# Patient Record
Sex: FEMALE | Race: WHITE | NOT HISPANIC OR LATINO | URBAN - METROPOLITAN AREA
[De-identification: names, ages, dates, MRNs, and addresses within clinical notes are randomized per-mention and may not be internally consistent; named-entity substitution may affect disease eponyms.]

---

## 2024-05-20 ENCOUNTER — LAB REQUISITION (OUTPATIENT)
Dept: LAB | Facility: HOSPITAL | Age: 24
End: 2024-05-20
Payer: COMMERCIAL

## 2024-05-20 DIAGNOSIS — N39.0 URINARY TRACT INFECTION, SITE NOT SPECIFIED: ICD-10-CM

## 2024-05-20 PROCEDURE — 87186 SC STD MICRODIL/AGAR DIL: CPT

## 2024-05-20 PROCEDURE — 87086 URINE CULTURE/COLONY COUNT: CPT

## 2024-05-23 LAB — BACTERIA UR CULT: ABNORMAL

## 2024-07-19 ENCOUNTER — LAB (OUTPATIENT)
Dept: LAB | Facility: LAB | Age: 24
End: 2024-07-19
Payer: COMMERCIAL

## 2024-07-19 ENCOUNTER — APPOINTMENT (OUTPATIENT)
Dept: PRIMARY CARE | Facility: CLINIC | Age: 24
End: 2024-07-19
Payer: COMMERCIAL

## 2024-07-19 VITALS
OXYGEN SATURATION: 97 % | HEIGHT: 68 IN | SYSTOLIC BLOOD PRESSURE: 120 MMHG | DIASTOLIC BLOOD PRESSURE: 79 MMHG | BODY MASS INDEX: 39.92 KG/M2 | WEIGHT: 263.4 LBS | HEART RATE: 87 BPM

## 2024-07-19 DIAGNOSIS — K29.50 CHRONIC GASTRITIS WITHOUT BLEEDING, UNSPECIFIED GASTRITIS TYPE: ICD-10-CM

## 2024-07-19 DIAGNOSIS — Z00.00 ANNUAL PHYSICAL EXAM: ICD-10-CM

## 2024-07-19 DIAGNOSIS — R10.13 EPIGASTRIC PAIN: ICD-10-CM

## 2024-07-19 DIAGNOSIS — R10.13 EPIGASTRIC PAIN: Primary | ICD-10-CM

## 2024-07-19 PROBLEM — F41.9 ANXIETY DISORDER: Status: ACTIVE | Noted: 2024-07-19

## 2024-07-19 PROBLEM — F98.8 ADD (ATTENTION DEFICIT DISORDER): Status: ACTIVE | Noted: 2024-07-19

## 2024-07-19 PROBLEM — N75.0 BARTHOLIN GLAND CYST: Status: RESOLVED | Noted: 2018-09-30 | Resolved: 2024-07-19

## 2024-07-19 PROCEDURE — 3008F BODY MASS INDEX DOCD: CPT | Performed by: INTERNAL MEDICINE

## 2024-07-19 PROCEDURE — 99204 OFFICE O/P NEW MOD 45 MIN: CPT | Performed by: INTERNAL MEDICINE

## 2024-07-19 PROCEDURE — 83013 H PYLORI (C-13) BREATH: CPT

## 2024-07-19 PROCEDURE — 1036F TOBACCO NON-USER: CPT | Performed by: INTERNAL MEDICINE

## 2024-07-19 RX ORDER — OMEPRAZOLE 40 MG/1
40 CAPSULE, DELAYED RELEASE ORAL
Qty: 30 CAPSULE | Refills: 3 | Status: SHIPPED | OUTPATIENT
Start: 2024-07-19

## 2024-07-19 ASSESSMENT — ENCOUNTER SYMPTOMS
DYSURIA: 0
ABDOMINAL PAIN: 1
CONSTIPATION: 0
FREQUENCY: 0
PALPITATIONS: 0
SHORTNESS OF BREATH: 0
DIARRHEA: 0
VOMITING: 0
ARTHRALGIAS: 0
DIZZINESS: 0
LIGHT-HEADEDNESS: 0
FEVER: 0
TROUBLE SWALLOWING: 0
NAUSEA: 0
COUGH: 0
SORE THROAT: 0
FATIGUE: 0

## 2024-07-19 ASSESSMENT — PAIN SCALES - GENERAL: PAINLEVEL: 2

## 2024-07-19 ASSESSMENT — PATIENT HEALTH QUESTIONNAIRE - PHQ9
2. FEELING DOWN, DEPRESSED OR HOPELESS: NOT AT ALL
SUM OF ALL RESPONSES TO PHQ9 QUESTIONS 1 AND 2: 0
1. LITTLE INTEREST OR PLEASURE IN DOING THINGS: NOT AT ALL

## 2024-07-19 NOTE — PATIENT INSTRUCTIONS
Take omeprazole 40 mg every day until follow up appointment but do not start until after breath test      Get fasting blood work and breath test.    Follow up Dr Haji in 2 months for gastritis 30 min appointment

## 2024-07-19 NOTE — ASSESSMENT & PLAN NOTE
Patient has what appears to be a chronic gastritis and or GERD.  I would rec an H Pylori breath test followed by a trial of treatment with omeprazole 40 mg daily for 6 to 8 weeks.  I will see her back after the trial of PPI.    I will also check baseline labs.    I have considered  a differential including IBS, IBD, Pancreatitis  and other dx but the  gastritis seems most likely.

## 2024-07-19 NOTE — PROGRESS NOTES
"Subjective   Patient ID: Jessica Elizabeth is a 24 y.o. female who presents for new patient visit.    Patient is here for ongoing stomach issues.  Her symptoms began over a year ago while she was in [place] studying abroad.  She began an eval in [place] but then came back to the [place] and never completed the eval.  Her symptoms were also waxing and waning.  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     Symptoms are primarily pain in the epigastric area . The pain can radiate to the right or the left of the abdomen but always in the upper abd.  She also has episodes of severe heart burn and reflux.  She does have an acidic or foul taste in her throat on occasion as well.        Review of Systems   Constitutional:  Negative for fatigue and fever.   HENT:  Negative for sore throat and trouble swallowing.    Eyes:  Negative for visual disturbance.   Respiratory:  Negative for cough and shortness of breath.    Cardiovascular:  Negative for chest pain, palpitations and leg swelling.   Gastrointestinal:  Positive for abdominal pain. Negative for constipation, diarrhea, nausea and vomiting.   Genitourinary:  Negative for dysuria and frequency.   Musculoskeletal:  Negative for arthralgias.   Skin:  Negative for rash.   Neurological:  Negative for dizziness and light-headedness.       Objective   Medication Documentation Review Audit       Reviewed by Rubina Haji MD (Physician) on 07/19/24 at 1141      Medication Order Taking? Sig Documenting Provider Last Dose Status   levonorgestrel (Mirena) 21 mcg/24 hr (8 yrs) 52 mg IUD [id number] Yes 52 mg by intrauterine route 1 time. Historical Provider, MD  Active                  No Known Allergies    /79 (BP Location: Left arm, Patient Position: Sitting)   Pulse 87   Ht 1.727 m (5' 8\")   Wt 119 kg (263 lb 6.4 oz)   SpO2 97%   BMI 40.05 kg/m²     Physical Exam  Constitutional:       Appearance: Normal appearance.   HENT:      " Head: Normocephalic and atraumatic.      Nose: Nose normal.   Eyes:      Extraocular Movements: Extraocular movements intact.      Pupils: Pupils are equal, round, and reactive to light.   Cardiovascular:      Rate and Rhythm: Normal rate and regular rhythm.   Pulmonary:      Breath sounds: Normal breath sounds.   Abdominal:      General: Abdomen is flat. Bowel sounds are normal.      Palpations: Abdomen is soft.   Musculoskeletal:      Right lower leg: No edema.      Left lower leg: No edema.   Neurological:      Mental Status: She is alert.           Assessment/Plan   Problem List Items Addressed This Visit       Chronic gastritis without bleeding     Patient has what appears to be a chronic gastritis and or GERD.  I would rec an H Pylori breath test followed by a trial of treatment with omeprazole 40 mg daily for 6 to 8 weeks.  I will see her back after the trial of PPI.    I will also check baseline labs.    I have considered  a differential including IBS, IBD, Pancreatitis  and other dx but the  gastritis seems most likely.         Relevant Medications    omeprazole (PriLOSEC) 40 mg DR capsule    Epigastric pain - Primary    Relevant Medications    omeprazole (PriLOSEC) 40 mg DR capsule    Other Relevant Orders    H. Pylori Breath Test     Other Visit Diagnoses       Annual physical exam        Relevant Orders    Lipid Panel    CBC    Comprehensive Metabolic Panel    TSH with reflex to Free T4 if abnormal    Vitamin D 25-Hydroxy,Total (for eval of Vitamin D levels)                   It has been a pleasure seeing you.  Rubina Haji MD

## 2024-07-20 LAB — UREA BREATH TEST QL: NEGATIVE

## 2024-08-12 ENCOUNTER — TELEPHONE (OUTPATIENT)
Dept: PRIMARY CARE | Facility: CLINIC | Age: 24
End: 2024-08-12

## 2024-08-12 ENCOUNTER — OFFICE VISIT (OUTPATIENT)
Dept: PRIMARY CARE | Facility: CLINIC | Age: 24
End: 2024-08-12
Payer: COMMERCIAL

## 2024-08-12 VITALS
BODY MASS INDEX: 40.13 KG/M2 | DIASTOLIC BLOOD PRESSURE: 82 MMHG | SYSTOLIC BLOOD PRESSURE: 121 MMHG | OXYGEN SATURATION: 96 % | HEART RATE: 78 BPM | WEIGHT: 264.8 LBS | HEIGHT: 68 IN

## 2024-08-12 DIAGNOSIS — J40 BRONCHITIS: ICD-10-CM

## 2024-08-12 DIAGNOSIS — R06.2 WHEEZE: Primary | ICD-10-CM

## 2024-08-12 DIAGNOSIS — R05.1 ACUTE COUGH: ICD-10-CM

## 2024-08-12 PROCEDURE — 99213 OFFICE O/P EST LOW 20 MIN: CPT | Performed by: INTERNAL MEDICINE

## 2024-08-12 PROCEDURE — 1036F TOBACCO NON-USER: CPT | Performed by: INTERNAL MEDICINE

## 2024-08-12 PROCEDURE — 3008F BODY MASS INDEX DOCD: CPT | Performed by: INTERNAL MEDICINE

## 2024-08-12 RX ORDER — DOXYCYCLINE 100 MG/1
100 CAPSULE ORAL 2 TIMES DAILY
Qty: 20 CAPSULE | Refills: 0 | Status: SHIPPED | OUTPATIENT
Start: 2024-08-12 | End: 2024-08-22

## 2024-08-12 RX ORDER — ALBUTEROL SULFATE 90 UG/1
2 INHALANT RESPIRATORY (INHALATION) EVERY 4 HOURS PRN
Qty: 8 G | Refills: 1 | Status: SHIPPED | OUTPATIENT
Start: 2024-08-12 | End: 2025-08-12

## 2024-08-12 ASSESSMENT — ENCOUNTER SYMPTOMS
FATIGUE: 1
DIARRHEA: 0
SINUS PRESSURE: 1
SHORTNESS OF BREATH: 1
TROUBLE SWALLOWING: 0
FEVER: 0
NAUSEA: 0
SORE THROAT: 0
DYSURIA: 0
ABDOMINAL PAIN: 0
CONSTIPATION: 0
SINUS PAIN: 1
VOMITING: 0
RHINORRHEA: 0
FREQUENCY: 0
ARTHRALGIAS: 0
DIZZINESS: 0
LIGHT-HEADEDNESS: 0
PALPITATIONS: 0
COUGH: 1

## 2024-08-12 ASSESSMENT — PATIENT HEALTH QUESTIONNAIRE - PHQ9
1. LITTLE INTEREST OR PLEASURE IN DOING THINGS: NOT AT ALL
SUM OF ALL RESPONSES TO PHQ9 QUESTIONS 1 AND 2: 0
2. FEELING DOWN, DEPRESSED OR HOPELESS: NOT AT ALL

## 2024-08-12 ASSESSMENT — PAIN SCALES - GENERAL: PAINLEVEL: 0-NO PAIN

## 2024-08-12 NOTE — ASSESSMENT & PLAN NOTE
Patient has cough and wheezing and is already on amoxicillin.  This may be a viral bronchitis however because it is worsening despite antibiotic I do recommend changing to something that may have better coverage for atypicals I am going to switch her to doxycycline 100 mg twice daily for the next 10 days.  In addition to this I am going to give her an albuterol inhaler to use 2 puffs 3 times a day.  She was instructed on how to use it and told that if she has any problems to asked the pharmacist and he could instruct her again.  Patient is already getting some vaginal itching from the amoxicillin so I did recommend a probiotic such as caffeine or yogurt OraLife way or mixed probiotic.  She may also use Monistat 7 over-the-counter.  She is to complete all antibiotics and if continues to have vaginal infection or itching can call back and we will consider Diflucan once she has completed the antibiotic.  Patient states understanding will call back if she needs anything else   patient to speak with md

## 2024-08-12 NOTE — PROGRESS NOTES
Subjective   Patient ID: Jessica Elizabeth is a 24 y.o. female who presents for an ongoing cough and congestion.    Patient is here as a work in visit for ongoing cough congestion and respiratory infection.  Around July 11 patient had a cough and for 2 weeks but then it moved into her head she started having postnasal drip congestion fatigue and became achy so she went to the urgent care where they gave her amoxicillin.  They thought she had a sinus infection and told her that if it does not get better in the next week she should see her primary care physician.  In the past week the cough has become more productive she feels more fatigued it is hard to take a deep breath in and she feels short of breath.  She denies nausea vomiting constipation diarrhea fever chills.  Occasionally her ears are popping but otherwise are not painful.  She has a pulse oximeter at home and from time to time and is gone down to 94%.  Patient denies sore throat but she also has a slight rash on her chin she is not quite sure what the rash is from.        Review of Systems   Constitutional:  Positive for fatigue. Negative for fever.   HENT:  Positive for congestion, nosebleeds, sinus pressure, sinus pain and sneezing. Negative for ear pain, postnasal drip, rhinorrhea, sore throat and trouble swallowing.    Eyes:  Negative for visual disturbance.   Respiratory:  Positive for cough and shortness of breath.    Cardiovascular:  Negative for chest pain, palpitations and leg swelling.   Gastrointestinal:  Negative for abdominal pain, constipation, diarrhea, nausea and vomiting.   Genitourinary:  Negative for dysuria and frequency.   Musculoskeletal:  Negative for arthralgias.   Skin:  Positive for rash.   Neurological:  Negative for dizziness and light-headedness.       Objective   Medication Documentation Review Audit       Reviewed by Rubina Haji MD (Physician) on 08/12/24 at 1448      Medication Order Taking? Sig Documenting Provider Last  "Dose Status   levonorgestrel (Mirena) 21 mcg/24 hr (8 yrs) 52 mg IUD [id number]  52 mg by intrauterine route 1 time. Historical Provider, MD  Active   omeprazole (PriLOSEC) 40 mg DR capsule [id number]  Take 1 capsule (40 mg) by mouth once daily in the morning. Take before meals. Do not crush or chew. Rubina Haji MD  Active                  No Known Allergies    /82 (BP Location: Left arm, Patient Position: Sitting)   Pulse 78   Ht 1.715 m (5' 7.5\")   Wt 120 kg (264 lb 12.8 oz)   SpO2 96%   BMI 40.86 kg/m²     Physical Exam  Constitutional:       Appearance: Normal appearance.   HENT:      Head: Normocephalic and atraumatic.      Nose: Nose normal.   Eyes:      Extraocular Movements: Extraocular movements intact.      Pupils: Pupils are equal, round, and reactive to light.   Cardiovascular:      Rate and Rhythm: Normal rate and regular rhythm.   Pulmonary:      Breath sounds: Wheezing present.   Abdominal:      General: Abdomen is flat. Bowel sounds are normal.      Palpations: Abdomen is soft.   Musculoskeletal:      Right lower leg: No edema.      Left lower leg: No edema.   Skin:     Findings: Rash present.      Comments: Patient has a small red papular rash below her chin into her throat which blanches easily and does not appear to be infectious.   Neurological:      Mental Status: She is alert.           Assessment/Plan   Problem List Items Addressed This Visit       Wheeze - Primary    Relevant Medications    albuterol (Ventolin HFA) 90 mcg/actuation inhaler    doxycycline (Vibramycin) 100 mg capsule    Acute cough    Bronchitis     Patient has cough and wheezing and is already on amoxicillin.  This may be a viral bronchitis however because it is worsening despite antibiotic I do recommend changing to something that may have better coverage for atypicals I am going to switch her to doxycycline 100 mg twice daily for the next 10 days.  In addition to this I am going to give her an albuterol inhaler " to use 2 puffs 3 times a day.  She was instructed on how to use it and told that if she has any problems to asked the pharmacist and he could instruct her again.  Patient is already getting some vaginal itching from the amoxicillin so I did recommend a probiotic such as caffeine or yogurt OraLife way or mixed probiotic.  She may also use Monistat 7 over-the-counter.  She is to complete all antibiotics and if continues to have vaginal infection or itching can call back and we will consider Diflucan once she has completed the antibiotic.  Patient states understanding will call back if she needs anything else         Relevant Medications    albuterol (Ventolin HFA) 90 mcg/actuation inhaler    doxycycline (Vibramycin) 100 mg capsule              It has been a pleasure seeing you.  Rubina Haji MD

## 2024-08-12 NOTE — TELEPHONE ENCOUNTER
Patient calling- has been fighting a cough since 7/11/24. Last two weeks cough has worsened so went to urgent care and was given- amoxicillin 500 mg twice a day for ten days. Also told to use over the counter  cough and congestion medication ,but that  has not helped much.  Fighting low energy with this as well. Did a covid test, and it was negative. Asking if she can be seen for this.

## 2024-08-12 NOTE — PATIENT INSTRUCTIONS
Take antibiotic with food    Use monistat 7 OTC for vaginal itching    Try Kefir yougurt or Lifeway 4 oz 2 to 3 times a day    Use inhailer 2 puffs 3 times a day

## 2024-09-04 ENCOUNTER — APPOINTMENT (OUTPATIENT)
Dept: PRIMARY CARE | Facility: CLINIC | Age: 24
End: 2024-09-04
Payer: COMMERCIAL

## 2025-04-02 ENCOUNTER — APPOINTMENT (OUTPATIENT)
Dept: PRIMARY CARE | Facility: CLINIC | Age: 25
End: 2025-04-02
Payer: COMMERCIAL

## 2025-04-02 VITALS
WEIGHT: 269.4 LBS | BODY MASS INDEX: 40.83 KG/M2 | OXYGEN SATURATION: 97 % | HEART RATE: 80 BPM | SYSTOLIC BLOOD PRESSURE: 128 MMHG | HEIGHT: 68 IN | DIASTOLIC BLOOD PRESSURE: 78 MMHG

## 2025-04-02 DIAGNOSIS — F41.1 GENERALIZED ANXIETY DISORDER: Primary | ICD-10-CM

## 2025-04-02 DIAGNOSIS — R41.840 ATTENTION OR CONCENTRATION DEFICIT: ICD-10-CM

## 2025-04-02 PROCEDURE — 3008F BODY MASS INDEX DOCD: CPT | Performed by: INTERNAL MEDICINE

## 2025-04-02 PROCEDURE — 1036F TOBACCO NON-USER: CPT | Performed by: INTERNAL MEDICINE

## 2025-04-02 PROCEDURE — 99213 OFFICE O/P EST LOW 20 MIN: CPT | Performed by: INTERNAL MEDICINE

## 2025-04-02 RX ORDER — DULOXETIN HYDROCHLORIDE 20 MG/1
20 CAPSULE, DELAYED RELEASE ORAL DAILY
Qty: 30 CAPSULE | Refills: 2 | Status: SHIPPED | OUTPATIENT
Start: 2025-04-02

## 2025-04-02 ASSESSMENT — ENCOUNTER SYMPTOMS
TROUBLE SWALLOWING: 0
ARTHRALGIAS: 0
FEVER: 0
DYSPHORIC MOOD: 0
FREQUENCY: 0
CONSTIPATION: 0
SLEEP DISTURBANCE: 0
PALPITATIONS: 0
NAUSEA: 0
DYSURIA: 0
HALLUCINATIONS: 0
SORE THROAT: 0
SHORTNESS OF BREATH: 0
ABDOMINAL PAIN: 0
DIARRHEA: 0
DECREASED CONCENTRATION: 1
LIGHT-HEADEDNESS: 0
CONFUSION: 0
DIZZINESS: 0
FATIGUE: 0
HYPERACTIVE: 0
COUGH: 0
AGITATION: 0
NERVOUS/ANXIOUS: 1
VOMITING: 0

## 2025-04-02 ASSESSMENT — PAIN SCALES - GENERAL: PAINLEVEL_OUTOF10: 0-NO PAIN

## 2025-04-02 ASSESSMENT — PATIENT HEALTH QUESTIONNAIRE - PHQ9
2. FEELING DOWN, DEPRESSED OR HOPELESS: NOT AT ALL
1. LITTLE INTEREST OR PLEASURE IN DOING THINGS: NOT AT ALL
SUM OF ALL RESPONSES TO PHQ9 QUESTIONS 1 AND 2: 0

## 2025-04-02 NOTE — ASSESSMENT & PLAN NOTE
Patient is here for possible treatment of her ADD versus anxiety.  Because of the way she describes the fatigue I am much more concerned about anxiety then I am her attention deficit disorder.  She does not want to try Strattera again but she did agree to try something similar.  At this time we are going to try her on duloxetine 20 mg daily and see her back in 1 month    If we are unable to stabilize her anxiety and mood we may have to send her back to psychiatry.

## 2025-04-02 NOTE — PROGRESS NOTES
Subjective   Patient ID: Jessica Elizabeth is a 25 y.o. female who presents for No chief complaint on file..  Patient is here for evaluation of possible ADD and or anxiety.  When she was younger she was seeing a pediatric psychiatrist who had her on several different medications.  They have tried Strattera in her but said it was of no help.  Then the psychiatrist tried several other drugs and retired and she did not have any treatment beyond that.  She does have known anxiety but no full-blown panic attacks.  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  She feels like she has been's a lot of energy trying to stay focused and is very fatigued and rundown.          Review of Systems   Constitutional:  Negative for fatigue and fever.   HENT:  Negative for sore throat and trouble swallowing.    Eyes:  Negative for visual disturbance.   Respiratory:  Negative for cough and shortness of breath.    Cardiovascular:  Negative for chest pain, palpitations and leg swelling.   Gastrointestinal:  Negative for abdominal pain, constipation, diarrhea, nausea and vomiting.   Genitourinary:  Negative for dysuria and frequency.   Musculoskeletal:  Negative for arthralgias.   Skin:  Negative for rash.   Neurological:  Negative for dizziness and light-headedness.   Psychiatric/Behavioral:  Positive for decreased concentration. Negative for agitation, behavioral problems, confusion, dysphoric mood, hallucinations, self-injury, sleep disturbance and suicidal ideas. The patient is nervous/anxious. The patient is not hyperactive.        Objective   Medication Documentation Review Audit       Reviewed by Rubina Haji MD (Physician) on 04/02/25 at 1508      Medication Order Taking? Sig Documenting Provider Last Dose Status   albuterol (Ventolin HFA) 90 mcg/actuation inhaler [id number]  Inhale 2 puffs every 4 hours if needed for wheezing or shortness of breath.   Patient not taking:  "Reported on 4/2/2025    Rubina Haji MD  Active   levonorgestrel (Mirena) 21 mcg/24 hr (8 yrs) 52 mg IUD [id number] Yes 52 mg by intrauterine route 1 time. Historical Provider, MD  Active   omeprazole (PriLOSEC) 40 mg DR capsule [id number]  Take 1 capsule (40 mg) by mouth once daily in the morning. Take before meals. Do not crush or chew.   Patient not taking: Reported on 4/2/2025    Rubina Haji MD  Active                  No Known Allergies    /78   Pulse 80   Ht 1.715 m (5' 7.5\")   Wt 122 kg (269 lb 6.4 oz)   SpO2 97%   BMI 41.57 kg/m²     Physical Exam  Constitutional:       Appearance: Normal appearance. She is obese.   HENT:      Head: Normocephalic and atraumatic.      Nose: Nose normal.   Eyes:      Extraocular Movements: Extraocular movements intact.      Pupils: Pupils are equal, round, and reactive to light.   Cardiovascular:      Rate and Rhythm: Normal rate and regular rhythm.   Pulmonary:      Breath sounds: Normal breath sounds.   Abdominal:      General: Abdomen is flat. Bowel sounds are normal.      Palpations: Abdomen is soft.   Musculoskeletal:      Right lower leg: No edema.      Left lower leg: No edema.   Neurological:      Mental Status: She is alert.           Assessment/Plan   Problem List Items Addressed This Visit       ADD (attention deficit disorder)    Anxiety disorder - Primary     Patient is here for possible treatment of her ADD versus anxiety.  Because of the way she describes the fatigue I am much more concerned about anxiety then I am her attention deficit disorder.  She does not want to try Strattera again but she did agree to try something similar.  At this time we are going to try her on duloxetine 20 mg daily and see her back in 1 month    If we are unable to stabilize her anxiety and mood we may have to send her back to psychiatry.         Relevant Medications    DULoxetine (Cymbalta) 20 mg DR capsule              It has been a pleasure seeing you.  Rubina CAMPOS" MD [facility]

## 2025-04-08 ENCOUNTER — APPOINTMENT (OUTPATIENT)
Dept: PRIMARY CARE | Facility: CLINIC | Age: 25
End: 2025-04-08
Payer: COMMERCIAL

## 2025-04-30 ENCOUNTER — APPOINTMENT (OUTPATIENT)
Dept: PRIMARY CARE | Facility: CLINIC | Age: 25
End: 2025-04-30
Payer: COMMERCIAL

## 2025-04-30 VITALS
HEART RATE: 90 BPM | OXYGEN SATURATION: 97 % | SYSTOLIC BLOOD PRESSURE: 111 MMHG | BODY MASS INDEX: 40.13 KG/M2 | RESPIRATION RATE: 16 BRPM | DIASTOLIC BLOOD PRESSURE: 73 MMHG | HEIGHT: 68 IN | WEIGHT: 264.8 LBS

## 2025-04-30 DIAGNOSIS — F41.1 GENERALIZED ANXIETY DISORDER: ICD-10-CM

## 2025-04-30 PROCEDURE — 99213 OFFICE O/P EST LOW 20 MIN: CPT | Performed by: INTERNAL MEDICINE

## 2025-04-30 PROCEDURE — 1036F TOBACCO NON-USER: CPT | Performed by: INTERNAL MEDICINE

## 2025-04-30 PROCEDURE — 3008F BODY MASS INDEX DOCD: CPT | Performed by: INTERNAL MEDICINE

## 2025-04-30 RX ORDER — DULOXETIN HYDROCHLORIDE 20 MG/1
40 CAPSULE, DELAYED RELEASE ORAL DAILY
Qty: 60 CAPSULE | Refills: 1 | Status: SHIPPED | OUTPATIENT
Start: 2025-04-30

## 2025-04-30 ASSESSMENT — ENCOUNTER SYMPTOMS
CONSTIPATION: 0
PALPITATIONS: 0
DYSURIA: 0
VOMITING: 0
COUGH: 0
DIARRHEA: 0
SHORTNESS OF BREATH: 0
FREQUENCY: 0
ABDOMINAL PAIN: 0
SORE THROAT: 0
FEVER: 0
LIGHT-HEADEDNESS: 0
NAUSEA: 0
FATIGUE: 0
ARTHRALGIAS: 0
TROUBLE SWALLOWING: 0
DIZZINESS: 0

## 2025-04-30 ASSESSMENT — PAIN SCALES - GENERAL: PAINLEVEL_OUTOF10: 0-NO PAIN

## 2025-04-30 NOTE — PATIENT INSTRUCTIONS
Increase Duloxetine to 40 mg a day by taking 2 of the 20 mg pills    Follow up Dr Haji in 1 month with 30 min appointment for Anxiety

## 2025-04-30 NOTE — ASSESSMENT & PLAN NOTE
Duloxetine appeared to start working but is not working very well now.  At this time we will increase the duloxetine to 40 mg dose and see her back in 1 month.  Patient will be getting  June 21 of this year

## 2025-04-30 NOTE — PROGRESS NOTES
Subjective   Patient ID: Jessica Elizabeth is a 25 y.o. female who presents for No chief complaint on file..  Patient is here for a follow-up on her anxiety disorder.  Last month we started her on duloxetine 20 mg daily.  She notes that she started feeling better within 7 to 10 days however after that it seemed to have regressed and not be working anymore.  Patient admits she stopped taking it this weekend because she was drinking alcohol for her bachelorette party and did not know if it was safe to drink.  She has not had any side effects or complaints        Review of Systems   Constitutional:  Negative for fatigue and fever.   HENT:  Negative for sore throat and trouble swallowing.    Eyes:  Negative for visual disturbance.   Respiratory:  Negative for cough and shortness of breath.    Cardiovascular:  Negative for chest pain, palpitations and leg swelling.   Gastrointestinal:  Negative for abdominal pain, constipation, diarrhea, nausea and vomiting.   Genitourinary:  Negative for dysuria and frequency.   Musculoskeletal:  Negative for arthralgias.   Skin:  Negative for rash.   Neurological:  Negative for dizziness and light-headedness.       Objective   Medication Documentation Review Audit       Reviewed by Rubina Haji MD (Physician) on 04/30/25 at 1537      Medication Order Taking? Sig Documenting Provider Last Dose Status   albuterol (Ventolin HFA) 90 mcg/actuation inhaler [id number]  Inhale 2 puffs every 4 hours if needed for wheezing or shortness of breath.   Patient not taking: Reported on 4/2/2025    Rubina Haji MD  Active   DULoxetine (Cymbalta) 20 mg DR capsule [id number]  Take 1 capsule (20 mg) by mouth once daily. Rubina Haji MD  Active   levonorgestrel (Mirena) 21 mcg/24 hr (8 yrs) 52 mg IUD [id number]  52 mg by intrauterine route 1 time. Historical Provider, MD  Active   omeprazole (PriLOSEC) 40 mg DR capsule [id number]  Take 1 capsule (40 mg) by mouth once daily in the morning. Take before  "meals. Do not crush or chew.   Patient not taking: Reported on 4/2/2025    Rubina Haji MD  Active                  Allergies[1]    /73   Pulse 90   Resp 16   Ht 1.715 m (5' 7.5\")   Wt 120 kg (264 lb 12.8 oz)   SpO2 97%   BMI 40.86 kg/m²     Physical Exam  Constitutional:       Appearance: Normal appearance.   HENT:      Head: Normocephalic and atraumatic.      Nose: Nose normal.   Eyes:      Extraocular Movements: Extraocular movements intact.      Pupils: Pupils are equal, round, and reactive to light.   Cardiovascular:      Rate and Rhythm: Normal rate and regular rhythm.   Pulmonary:      Breath sounds: Normal breath sounds.   Abdominal:      General: Abdomen is flat. Bowel sounds are normal.      Palpations: Abdomen is soft.   Musculoskeletal:      Right lower leg: No edema.      Left lower leg: No edema.   Neurological:      Mental Status: She is alert.           Assessment/Plan   Problem List Items Addressed This Visit    None             It has been a pleasure seeing you.  Rubina Haji MD       [1] No Known Allergies    "

## 2025-05-28 ENCOUNTER — APPOINTMENT (OUTPATIENT)
Dept: PRIMARY CARE | Facility: CLINIC | Age: 25
End: 2025-05-28
Payer: COMMERCIAL

## 2025-05-28 VITALS
BODY MASS INDEX: 40.77 KG/M2 | WEIGHT: 269 LBS | SYSTOLIC BLOOD PRESSURE: 129 MMHG | DIASTOLIC BLOOD PRESSURE: 82 MMHG | HEART RATE: 91 BPM | OXYGEN SATURATION: 96 % | HEIGHT: 68 IN

## 2025-05-28 DIAGNOSIS — R10.13 EPIGASTRIC PAIN: Primary | ICD-10-CM

## 2025-05-28 DIAGNOSIS — F41.1 GENERALIZED ANXIETY DISORDER: ICD-10-CM

## 2025-05-28 DIAGNOSIS — K29.50 OTHER CHRONIC GASTRITIS WITHOUT HEMORRHAGE: ICD-10-CM

## 2025-05-28 PROCEDURE — 3008F BODY MASS INDEX DOCD: CPT | Performed by: INTERNAL MEDICINE

## 2025-05-28 PROCEDURE — 1036F TOBACCO NON-USER: CPT | Performed by: INTERNAL MEDICINE

## 2025-05-28 PROCEDURE — G8433 SCR FOR DEP NOT CPT DOC RSN: HCPCS | Performed by: INTERNAL MEDICINE

## 2025-05-28 PROCEDURE — 99214 OFFICE O/P EST MOD 30 MIN: CPT | Performed by: INTERNAL MEDICINE

## 2025-05-28 RX ORDER — DULOXETIN HYDROCHLORIDE 20 MG/1
20 CAPSULE, DELAYED RELEASE ORAL DAILY
Qty: 60 CAPSULE | Refills: 1 | Status: SHIPPED | OUTPATIENT
Start: 2025-05-28

## 2025-05-28 ASSESSMENT — ENCOUNTER SYMPTOMS
ARTHRALGIAS: 0
VOMITING: 0
COUGH: 0
FREQUENCY: 0
DIARRHEA: 0
NAUSEA: 0
DYSURIA: 0
SORE THROAT: 0
FATIGUE: 0
CONSTIPATION: 0
ABDOMINAL PAIN: 0
SHORTNESS OF BREATH: 0
TROUBLE SWALLOWING: 0
LIGHT-HEADEDNESS: 0
DIZZINESS: 0
PALPITATIONS: 0
FEVER: 0

## 2025-05-28 ASSESSMENT — PAIN SCALES - GENERAL: PAINLEVEL_OUTOF10: 0-NO PAIN

## 2025-05-28 NOTE — PROGRESS NOTES
"Subjective   Patient ID: Jessica Elizabeth is a 25 y.o. female who presents for 1 month follow up for anxiety management.     Patient is here for recheck on her anxiety.  She did well anxiety wise on duloxetine 40 mg unfortunately she had no libido or anorgasmia and was not happy with it.  We decreased her back dose back to duloxetine 20 mg daily and she said it is enough to handle her anxiety that she can bring herself down and avoid the panic attacks.  She also has some sex drive and since she is getting  June 21 really does not want to change anything else at this time.        Review of Systems   Constitutional:  Negative for fatigue and fever.   HENT:  Negative for sore throat and trouble swallowing.    Eyes:  Negative for visual disturbance.   Respiratory:  Negative for cough and shortness of breath.    Cardiovascular:  Negative for chest pain, palpitations and leg swelling.   Gastrointestinal:  Negative for abdominal pain, constipation, diarrhea, nausea and vomiting.   Genitourinary:  Negative for dysuria and frequency.   Musculoskeletal:  Negative for arthralgias.   Skin:  Negative for rash.   Neurological:  Negative for dizziness and light-headedness.       Objective   Medication Documentation Review Audit       Reviewed by Rubina Haji MD (Physician) on 05/28/25 at 1527      Medication Order Taking? Sig Documenting Provider Last Dose Status   albuterol (Ventolin HFA) 90 mcg/actuation inhaler [id number]  Inhale 2 puffs every 4 hours if needed for wheezing or shortness of breath.   Patient not taking: Reported on 5/28/2025    Rubina Haji MD  Active   DULoxetine (Cymbalta) 20 mg DR capsule [id number] Yes Take 2 capsules (40 mg) by mouth once daily. Rubina Haji MD  Active   levonorgestrel (Mirena) 21 mcg/24 hr (8 yrs) 52 mg IUD [id number] Yes 52 mg by intrauterine route 1 time. Historical Provider, MD  Active                  Allergies[1]    /82   Pulse 91   Ht 1.715 m (5' 7.5\") Comment: " w/o shoes on  Wt 122 kg (269 lb)   SpO2 96%   BMI 41.51 kg/m²     Physical Exam  Constitutional:       Appearance: Normal appearance.   HENT:      Head: Normocephalic and atraumatic.      Nose: Nose normal.   Eyes:      Extraocular Movements: Extraocular movements intact.      Pupils: Pupils are equal, round, and reactive to light.   Cardiovascular:      Rate and Rhythm: Normal rate and regular rhythm.   Pulmonary:      Breath sounds: Normal breath sounds.   Abdominal:      General: Abdomen is flat. Bowel sounds are normal.      Palpations: Abdomen is soft.   Musculoskeletal:      Right lower leg: No edema.      Left lower leg: No edema.   Neurological:      Mental Status: She is alert.           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           Relevant Medications    DULoxetine (Cymbalta) 20 mg DR capsule    Chronic gastritis without bleeding    Patient's reflux symptoms and epigastric pain are doing well currently she remains on the probiotic Kefir.  She will continue on this as it is working well.         Epigastric pain - Primary     Patient still has not gotten the annual blood work and was reminded to get her fasting labs.     It has been a pleasure seeing you.  Rubina Haji MD         [1] No Known Allergies

## 2025-05-28 NOTE — ASSESSMENT & PLAN NOTE
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

## 2025-05-28 NOTE — ASSESSMENT & PLAN NOTE
Patient's reflux symptoms and epigastric pain are doing well currently she remains on the probiotic Kefir.  She will continue on this as it is working well.

## 2025-08-05 ENCOUNTER — APPOINTMENT (OUTPATIENT)
Dept: PRIMARY CARE | Facility: CLINIC | Age: 25
End: 2025-08-05
Payer: COMMERCIAL

## 2025-08-05 VITALS
WEIGHT: 268 LBS | OXYGEN SATURATION: 97 % | BODY MASS INDEX: 40.62 KG/M2 | SYSTOLIC BLOOD PRESSURE: 128 MMHG | HEART RATE: 74 BPM | DIASTOLIC BLOOD PRESSURE: 80 MMHG | HEIGHT: 68 IN

## 2025-08-05 DIAGNOSIS — F41.1 GENERALIZED ANXIETY DISORDER: Primary | ICD-10-CM

## 2025-08-05 DIAGNOSIS — F41.0 PANIC ATTACK: ICD-10-CM

## 2025-08-05 PROCEDURE — G8433 SCR FOR DEP NOT CPT DOC RSN: HCPCS | Performed by: INTERNAL MEDICINE

## 2025-08-05 PROCEDURE — 99214 OFFICE O/P EST MOD 30 MIN: CPT | Performed by: INTERNAL MEDICINE

## 2025-08-05 PROCEDURE — 1036F TOBACCO NON-USER: CPT | Performed by: INTERNAL MEDICINE

## 2025-08-05 PROCEDURE — 3008F BODY MASS INDEX DOCD: CPT | Performed by: INTERNAL MEDICINE

## 2025-08-05 RX ORDER — HYDROXYZINE HYDROCHLORIDE 25 MG/1
25 TABLET, FILM COATED ORAL 2 TIMES DAILY PRN
Qty: 60 TABLET | Refills: 0 | Status: SHIPPED | OUTPATIENT
Start: 2025-08-05

## 2025-08-05 ASSESSMENT — ENCOUNTER SYMPTOMS
SHORTNESS OF BREATH: 0
FREQUENCY: 0
FATIGUE: 0
COUGH: 0
LIGHT-HEADEDNESS: 0
CONSTIPATION: 0
CONFUSION: 0
SORE THROAT: 0
DYSURIA: 0
SLEEP DISTURBANCE: 0
NAUSEA: 0
TROUBLE SWALLOWING: 0
ARTHRALGIAS: 0
DIZZINESS: 0
PALPITATIONS: 0
DIARRHEA: 0
VOMITING: 0
ABDOMINAL PAIN: 0
FEVER: 0
NERVOUS/ANXIOUS: 1

## 2025-08-05 ASSESSMENT — PAIN SCALES - GENERAL: PAINLEVEL_OUTOF10: 0-NO PAIN

## 2025-08-05 NOTE — ASSESSMENT & PLAN NOTE
Patient has anxiety disorder with panic attacks.  At this time she will continue duloxetine 20 mg daily and to this we will add Atarax or hydroxyzine 25 mg as needed if she gets a panic attack.  Patient will give this a try and if she has any issues or complaints she will let me know.  She was reminded that this medication could make her lethargic and she should not operate heavy machinery until she knows how it affects her directly.  Patient states understanding    If there are any issues she will come see me otherwise follow-up will be in 6 months

## 2025-08-05 NOTE — PROGRESS NOTES
Subjective   Patient ID: Jessica Reina is a 25 y.o. female who presents for general health management.    Patient is here for follow-up on her anxiety disorder.  She is on duloxetine 20 mg daily and she has only had 1 panic attack.  The panic attack was related to some wasps which got into her apartment.  They have moved into her new apartment now but she is worried about more panic attacks.  She is comfortable with the duloxetine at the 20 mg dose.  When she was at the 40 mg dose she had no libido and does not want to increase it again now.        Review of Systems   Constitutional:  Negative for fatigue and fever.   HENT:  Negative for sore throat and trouble swallowing.    Eyes:  Negative for visual disturbance.   Respiratory:  Negative for cough and shortness of breath.    Cardiovascular:  Negative for chest pain, palpitations and leg swelling.   Gastrointestinal:  Negative for abdominal pain, constipation, diarrhea, nausea and vomiting.   Genitourinary:  Negative for dysuria and frequency.   Musculoskeletal:  Negative for arthralgias.   Skin:  Negative for rash.   Neurological:  Negative for dizziness and light-headedness.   Psychiatric/Behavioral:  Negative for confusion, self-injury, sleep disturbance and suicidal ideas. The patient is nervous/anxious.        Objective   Medication Documentation Review Audit       Reviewed by Rubina Haji MD (Physician) on 08/05/25 at 0937      Medication Order Taking? Sig Documenting Provider Last Dose Status   albuterol (Ventolin HFA) 90 mcg/actuation inhaler [id number]  Inhale 2 puffs every 4 hours if needed for wheezing or shortness of breath.   Patient not taking: Reported on 5/28/2025    Rubina Haji MD  Active   DULoxetine (Cymbalta) 20 mg DR capsule [id number]  Take 1 capsule (20 mg) by mouth once daily. Rubina Haji MD  Active   levonorgestrel (Mirena) 21 mcg/24 hr (8 yrs) 52 mg IUD [id number]  52 mg by intrauterine route 1 time. Historical Provider,  "MD   25 2350                  Allergies[1]    /80   Pulse 74   Ht 1.715 m (5' 7.5\")   Wt 122 kg (268 lb)   SpO2 97%   BMI 41.36 kg/m²     Physical Exam  Constitutional:       Appearance: Normal appearance. She is obese.   HENT:      Head: Normocephalic and atraumatic.      Nose: Nose normal.     Eyes:      Extraocular Movements: Extraocular movements intact.      Pupils: Pupils are equal, round, and reactive to light.       Cardiovascular:      Rate and Rhythm: Normal rate and regular rhythm.   Pulmonary:      Breath sounds: Normal breath sounds.   Abdominal:      General: Abdomen is flat. Bowel sounds are normal.      Palpations: Abdomen is soft.     Musculoskeletal:      Right lower leg: No edema.      Left lower leg: No edema.     Neurological:      Mental Status: She is alert.           Assessment/Plan   Problem List Items Addressed This Visit       Anxiety disorder - Primary    Patient has anxiety disorder with panic attacks.  At this time she will continue duloxetine 20 mg daily and to this we will add Atarax or hydroxyzine 25 mg as needed if she gets a panic attack.  Patient will give this a try and if she has any issues or complaints she will let me know.  She was reminded that this medication could make her lethargic and she should not operate heavy machinery until she knows how it affects her directly.  Patient states understanding    If there are any issues she will come see me otherwise follow-up will be in 6 months         Relevant Medications    hydrOXYzine HCL (Atarax) 25 mg tablet    Panic attack       Patient was also reminded to please get her fasting blood work       It has been a pleasure seeing you.  Rubina Haji MD         [1] No Known Allergies    "